# Patient Record
Sex: MALE | Race: WHITE | NOT HISPANIC OR LATINO | URBAN - NONMETROPOLITAN AREA
[De-identification: names, ages, dates, MRNs, and addresses within clinical notes are randomized per-mention and may not be internally consistent; named-entity substitution may affect disease eponyms.]

---

## 2018-04-20 ENCOUNTER — LAB (OUTPATIENT)
Dept: LAB | Facility: HOSPITAL | Age: 23
End: 2018-04-20

## 2018-04-20 ENCOUNTER — TRANSCRIBE ORDERS (OUTPATIENT)
Dept: LAB | Facility: HOSPITAL | Age: 23
End: 2018-04-20

## 2018-04-20 DIAGNOSIS — R19.7 DIARRHEA, UNSPECIFIED TYPE: Primary | ICD-10-CM

## 2018-04-20 DIAGNOSIS — R19.7 DIARRHEA, UNSPECIFIED TYPE: ICD-10-CM

## 2018-04-20 LAB — C DIFF GDH STL QL: NEGATIVE

## 2018-04-20 PROCEDURE — 87449 NOS EACH ORGANISM AG IA: CPT

## 2018-04-20 PROCEDURE — 87046 STOOL CULTR AEROBIC BACT EA: CPT

## 2018-04-20 PROCEDURE — 87045 FECES CULTURE AEROBIC BACT: CPT

## 2018-04-20 PROCEDURE — 87324 CLOSTRIDIUM AG IA: CPT

## 2018-04-22 LAB
BACTERIA SPEC AEROBE CULT: ABNORMAL
BACTERIA SPEC AEROBE CULT: ABNORMAL

## 2018-04-23 ENCOUNTER — TELEPHONE (OUTPATIENT)
Dept: FAMILY MEDICINE CLINIC | Facility: CLINIC | Age: 23
End: 2018-04-23

## 2018-04-24 NOTE — TELEPHONE ENCOUNTER
Called patient with his lab results from stool cultures on 04/20/2018.   No normal fecal deborah -   Negative for c-diff, salmonella, shigella, campylobacter and Ecoli or yersinia isolated.     He has been instructed to start taking probiotics. He is agreeable. He reported no one has called from GI today.   Will continue to follow.   Follow up as needed with your PCP.     Asha Whitfield, APRN

## 2019-11-05 ENCOUNTER — OFFICE VISIT (OUTPATIENT)
Dept: GASTROENTEROLOGY | Facility: CLINIC | Age: 24
End: 2019-11-05

## 2019-11-05 ENCOUNTER — HOSPITAL ENCOUNTER (OUTPATIENT)
Facility: HOSPITAL | Age: 24
Setting detail: HOSPITAL OUTPATIENT SURGERY
End: 2019-11-05
Attending: INTERNAL MEDICINE | Admitting: INTERNAL MEDICINE

## 2019-11-05 VITALS
TEMPERATURE: 98.4 F | DIASTOLIC BLOOD PRESSURE: 78 MMHG | HEART RATE: 82 BPM | SYSTOLIC BLOOD PRESSURE: 135 MMHG | HEIGHT: 68 IN | BODY MASS INDEX: 19.4 KG/M2 | WEIGHT: 128 LBS

## 2019-11-05 DIAGNOSIS — R63.4 WEIGHT LOSS: Chronic | ICD-10-CM

## 2019-11-05 DIAGNOSIS — R19.7 DIARRHEA, UNSPECIFIED TYPE: Chronic | ICD-10-CM

## 2019-11-05 DIAGNOSIS — R11.0 NAUSEA: Chronic | ICD-10-CM

## 2019-11-05 DIAGNOSIS — R10.13 EPIGASTRIC PAIN: Primary | Chronic | ICD-10-CM

## 2019-11-05 DIAGNOSIS — R13.10 DYSPHAGIA, UNSPECIFIED TYPE: Chronic | ICD-10-CM

## 2019-11-05 DIAGNOSIS — R12 HEARTBURN: Chronic | ICD-10-CM

## 2019-11-05 PROCEDURE — 99204 OFFICE O/P NEW MOD 45 MIN: CPT | Performed by: NURSE PRACTITIONER

## 2019-11-05 RX ORDER — SODIUM CHLORIDE 9 MG/ML
70 INJECTION, SOLUTION INTRAVENOUS CONTINUOUS PRN
Status: CANCELLED | OUTPATIENT
Start: 2019-11-05

## 2019-11-05 RX ORDER — ONDANSETRON 4 MG/1
4 TABLET, FILM COATED ORAL EVERY 8 HOURS PRN
Qty: 30 TABLET | Refills: 1 | Status: SHIPPED | OUTPATIENT
Start: 2019-11-05

## 2019-11-05 RX ORDER — RANITIDINE 150 MG/1
150 TABLET ORAL AS NEEDED
COMMUNITY
End: 2019-11-05 | Stop reason: ALTCHOICE

## 2019-11-05 RX ORDER — PANTOPRAZOLE SODIUM 40 MG/1
TABLET, DELAYED RELEASE ORAL
Qty: 30 TABLET | Refills: 5 | Status: SHIPPED | OUTPATIENT
Start: 2019-11-05

## 2019-11-05 NOTE — PATIENT INSTRUCTIONS
1. Antireflux measures: Avoid fried, fatty foods, alcohol, chocolate, coffee, tea,  soft drinks, peppermint and spearmint, spicy foods, tomatoes and tomato based foods, onion based foods, and smoking. Other antireflux measures include weight reduction if overweight, avoiding tight clothing around the abdomen, elevating the head of the bed 6 inches with blocks under the head board, and don't drink or eat before going to bed and avoid lying down immediately after meals.  2. Pantoprazole 40 mg 1 by mouth in the am 30 minutes before breakfast.  3. Stop Ranitidine.   4. Zofran 4 mg 1 po every 8 hours as needed for nausea.   5. CBC, CMP, TSH, IgA, tTg IgA, CRP  6. Upper endoscopy-EGD: Description of the procedure, risks, benefits, alternatives and options, including nonoperative options, were discussed with the patient in detail. The patient understands and wishes to proceed.  7. Colonoscopy: Description of the procedure, risks, benefits, alternatives and options, including nonoperative options, were discussed with the patient in detail. The patient understands and wishes to proceed.

## 2019-11-05 NOTE — PROGRESS NOTES
Chief Complaint   Patient presents with   • Abdominal Pain   • Heartburn   • Diarrhea   • Weight Loss   • Nausea     There is a long standing history of epigastric pain. For the past 4 days, he has worsening of the pain. The pain is moderate to severe. It is a sharp, cramping pain. Eating makes the pain worse. He is taking Ranitidine as needed with mild improvement of the pain.     There is a long standing history of nausea. The patient may have nausea 2-3 days per week. Eating makes nausea worse. He is not taking anything for nausea.     There is a long standing history of heartburn. The patient takes Ranitidine as needed with moderate control of heartburn. Reflux is worse at night. Heartburn is described as moderate.     There is a long standing history of diarrhea. The patient has 2-4 episodes of diarrhea daily. Stools are described as loose. The patient denies diarrhea waking him at night. He is not taking anything for diarrhea. The patient denies bright red blood per rectum or melena.     The patient has lost about 30 pounds over the past 2 years. This is described as unintentional. He has not had as much of an appetite as he had in the past.    The patient has not had a colonoscopy in the past. There is no family history of colon cancer.    Abdominal Pain   This is a chronic problem. The current episode started more than 1 year ago. The onset quality is sudden. The problem occurs daily. The problem has been gradually worsening. The pain is located in the epigastric region. The pain is moderate. The quality of the pain is sharp and cramping. The abdominal pain does not radiate. Associated symptoms include arthralgias, diarrhea, nausea and weight loss. Pertinent negatives include no constipation, dysuria, fever, headaches, hematuria, myalgias or vomiting. The pain is aggravated by eating. The pain is relieved by nothing. He has tried H2 blockers for the symptoms. The treatment provided mild relief. His past  medical history is significant for GERD.   Heartburn   He complains of abdominal pain, heartburn and nausea. He reports no chest pain or no coughing. This is a chronic problem. The current episode started more than 1 year ago. The problem occurs occasionally. The problem has been unchanged. The heartburn duration is an hour. The heartburn is located in the substernum. The heartburn is of moderate intensity. The heartburn wakes him from sleep. Nothing aggravates the symptoms. Associated symptoms include fatigue and weight loss. There are no known risk factors. He has tried a histamine-2 antagonist for the symptoms. The treatment provided moderate relief.   Diarrhea    This is a chronic problem. The current episode started more than 1 year ago. The problem occurs 2 to 4 times per day. The problem has been unchanged. Diarrhea characteristics: loose. The patient states that diarrhea awakens him from sleep. Associated symptoms include abdominal pain, arthralgias, chills and weight loss. Pertinent negatives include no coughing, fever, headaches, myalgias or vomiting. Nothing aggravates the symptoms. There are no known risk factors. He has tried nothing for the symptoms.   Weight Loss   This is a chronic problem. Episode onset: 2017. The problem occurs intermittently. The problem has been gradually worsening. Associated symptoms include abdominal pain, arthralgias, chills, fatigue and nausea. Pertinent negatives include no chest pain, coughing, fever, headaches, joint swelling, myalgias, rash or vomiting. Nothing aggravates the symptoms. He has tried nothing for the symptoms.   Nausea   This is a chronic problem. The current episode started more than 1 year ago. The problem occurs intermittently. The problem has been unchanged. Associated symptoms include abdominal pain, arthralgias, chills, fatigue and nausea. Pertinent negatives include no chest pain, coughing, fever, headaches, joint swelling, myalgias, rash or  "vomiting. The symptoms are aggravated by eating. He has tried nothing for the symptoms.     Review of Systems   Constitutional: Positive for appetite change, chills, fatigue, unexpected weight change and weight loss. Negative for fever.   HENT: Negative for mouth sores, nosebleeds and trouble swallowing.    Eyes: Positive for visual disturbance. Negative for discharge and redness.   Respiratory: Negative for apnea, cough and shortness of breath.    Cardiovascular: Negative for chest pain, palpitations and leg swelling.   Gastrointestinal: Positive for abdominal pain, diarrhea, heartburn and nausea. Negative for abdominal distention, anal bleeding, blood in stool, constipation and vomiting.   Endocrine: Negative for cold intolerance, heat intolerance and polydipsia.   Genitourinary: Negative for dysuria, hematuria and urgency.   Musculoskeletal: Positive for arthralgias and back pain. Negative for joint swelling and myalgias.   Skin: Negative for rash.   Allergic/Immunologic: Negative for food allergies and immunocompromised state.   Neurological: Positive for dizziness and light-headedness. Negative for seizures, syncope and headaches.   Hematological: Negative for adenopathy. Does not bruise/bleed easily.   Psychiatric/Behavioral: Negative for dysphoric mood. The patient is nervous/anxious. The patient is not hyperactive.      Patient Active Problem List   Diagnosis   • Epigastric pain   • Nausea   • Dysphagia   • Weight loss   • Diarrhea   • Heartburn     Past Medical History:   Diagnosis Date   • Heartburn    • History of heart murmur in childhood     \"No longer\"   • Legally blind     left eye, since birth   • Snores    • Tattoos    • Vision problems     legally blind in left eye, scar tissue on right eye     Past Surgical History:   Procedure Laterality Date   • EYE SURGERY      at birth?   • FRACTURE SURGERY Left     arm     Family History   Problem Relation Age of Onset   • Irritable bowel syndrome Mother    • " "Colon cancer Neg Hx    • Cirrhosis Neg Hx    • Liver cancer Neg Hx    • Liver disease Neg Hx    • Stomach cancer Neg Hx    • Crohn's disease Neg Hx      Social History     Tobacco Use   • Smoking status: Current Every Day Smoker     Packs/day: 1.00     Types: Cigarettes     Start date: 2012   • Smokeless tobacco: Never Used   Substance Use Topics   • Alcohol use: Yes     Comment: perhaps once a week       Current Outpatient Medications:   •  ondansetron (ZOFRAN) 4 MG tablet, Take 1 tablet by mouth Every 8 (Eight) Hours As Needed for Nausea or Vomiting., Disp: 30 tablet, Rfl: 1  •  pantoprazole (PROTONIX) 40 MG EC tablet, 1 po daily in the am 30 minutes before breakfast, Disp: 30 tablet, Rfl: 5    No Known Allergies    Blood pressure 135/78, pulse 82, temperature 98.4 °F (36.9 °C), height 172.7 cm (68\"), weight 58.1 kg (128 lb).    Physical Exam   Constitutional: He is oriented to person, place, and time. He appears well-developed and well-nourished. No distress.   HENT:   Head: Normocephalic and atraumatic.   Right Ear: Hearing and external ear normal.   Left Ear: Hearing and external ear normal.   Nose: Nose normal.   Mouth/Throat: Oropharynx is clear and moist and mucous membranes are normal. Mucous membranes are not pale, not dry and not cyanotic. No oral lesions. No oropharyngeal exudate.   Eyes: Conjunctivae and EOM are normal. Right eye exhibits no discharge. Left eye exhibits no discharge.   Neck: Trachea normal. Neck supple. No JVD present. No edema present. No thyroid mass and no thyromegaly present.   Cardiovascular: Normal rate, regular rhythm, S2 normal and normal heart sounds. Exam reveals no gallop, no S3 and no friction rub.   No murmur heard.  Pulmonary/Chest: Effort normal and breath sounds normal. No respiratory distress. He exhibits no tenderness.   Abdominal: Normal appearance and bowel sounds are normal. He exhibits no distension, no ascites and no mass. There is no splenomegaly or hepatomegaly. " There is no tenderness. There is no rigidity, no rebound and no guarding. No hernia.     Vascular Status -  His right foot exhibits no edema. His left foot exhibits no edema.  Lymphadenopathy:     He has no cervical adenopathy.        Left: No supraclavicular adenopathy present.   Neurological: He is alert and oriented to person, place, and time. He has normal strength. No cranial nerve deficit or sensory deficit.   Skin: No rash noted. He is not diaphoretic. No cyanosis. No pallor. Nails show no clubbing.   Psychiatric: He has a normal mood and affect.   Nursing note and vitals reviewed.  Stigmata of chronic liver disease:  None.  Asterixis:  None.    Assessment:      ICD-10-CM ICD-9-CM   1. Epigastric pain R10.13 789.06   2. Nausea R11.0 787.02   3. Dysphagia, unspecified type R13.10 787.20   4. Heartburn R12 787.1   5. Weight loss R63.4 783.21   6. Diarrhea, unspecified type R19.7 787.91       Plan/  Patient Instructions   1. Antireflux measures: Avoid fried, fatty foods, alcohol, chocolate, coffee, tea,  soft drinks, peppermint and spearmint, spicy foods, tomatoes and tomato based foods, onion based foods, and smoking. Other antireflux measures include weight reduction if overweight, avoiding tight clothing around the abdomen, elevating the head of the bed 6 inches with blocks under the head board, and don't drink or eat before going to bed and avoid lying down immediately after meals.  2. Pantoprazole 40 mg 1 by mouth in the am 30 minutes before breakfast.  3. Stop Ranitidine.   4. Zofran 4 mg 1 po every 8 hours as needed for nausea.   5. CBC, CMP, TSH, IgA, tTg IgA, CRP  6. Upper endoscopy-EGD: Description of the procedure, risks, benefits, alternatives and options, including nonoperative options, were discussed with the patient in detail. The patient understands and wishes to proceed.  7. Colonoscopy: Description of the procedure, risks, benefits, alternatives and options, including nonoperative options, were  discussed with the patient in detail. The patient understands and wishes to proceed.     Jennifer Sanders, APRN

## 2019-11-19 ENCOUNTER — HOSPITAL ENCOUNTER (OUTPATIENT)
Facility: HOSPITAL | Age: 24
Setting detail: HOSPITAL OUTPATIENT SURGERY
End: 2019-11-19
Attending: INTERNAL MEDICINE | Admitting: INTERNAL MEDICINE